# Patient Record
Sex: FEMALE | Race: WHITE
[De-identification: names, ages, dates, MRNs, and addresses within clinical notes are randomized per-mention and may not be internally consistent; named-entity substitution may affect disease eponyms.]

---

## 2019-04-02 ENCOUNTER — HOSPITAL ENCOUNTER (OUTPATIENT)
Dept: HOSPITAL 95 - LAB EV | Age: 69
Discharge: HOME | End: 2019-04-02
Attending: FAMILY MEDICINE
Payer: COMMERCIAL

## 2019-04-02 DIAGNOSIS — I47.1: Primary | ICD-10-CM

## 2019-04-02 DIAGNOSIS — R50.9: ICD-10-CM

## 2019-04-02 LAB
ALBUMIN SERPL BCP-MCNC: 3.9 G/DL (ref 3.4–5)
ALBUMIN/GLOB SERPL: 1 {RATIO} (ref 0.8–1.8)
ALT SERPL W P-5'-P-CCNC: 48 U/L (ref 12–78)
ANION GAP SERPL CALCULATED.4IONS-SCNC: 13 MMOL/L (ref 6–16)
AST SERPL W P-5'-P-CCNC: 31 U/L (ref 12–37)
BASOPHILS # BLD AUTO: 0.04 K/MM3 (ref 0–0.23)
BASOPHILS NFR BLD AUTO: 1 % (ref 0–2)
BILIRUB SERPL-MCNC: 0.7 MG/DL (ref 0.1–1)
BUN SERPL-MCNC: 15 MG/DL (ref 8–24)
CALCIUM SERPL-MCNC: 9.2 MG/DL (ref 8.5–10.1)
CHLORIDE SERPL-SCNC: 98 MMOL/L (ref 98–108)
CO2 SERPL-SCNC: 25 MMOL/L (ref 21–32)
CREAT SERPL-MCNC: 1.34 MG/DL (ref 0.4–1)
DEPRECATED RDW RBC AUTO: 45.1 FL (ref 35.1–46.3)
EOSINOPHIL # BLD AUTO: 0.11 K/MM3 (ref 0–0.68)
EOSINOPHIL NFR BLD AUTO: 1 % (ref 0–6)
ERYTHROCYTE [DISTWIDTH] IN BLOOD BY AUTOMATED COUNT: 13.2 % (ref 11.7–14.2)
GLOBULIN SER CALC-MCNC: 3.8 G/DL (ref 2.2–4)
GLUCOSE SERPL-MCNC: 112 MG/DL (ref 70–99)
HCT VFR BLD AUTO: 42.6 % (ref 33–51)
HGB BLD-MCNC: 14.1 G/DL (ref 11.5–16)
IMM GRANULOCYTES # BLD AUTO: 0.01 K/MM3 (ref 0–0.1)
IMM GRANULOCYTES NFR BLD AUTO: 0 % (ref 0–1)
LYMPHOCYTES # BLD AUTO: 1.62 K/MM3 (ref 0.84–5.2)
LYMPHOCYTES NFR BLD AUTO: 21 % (ref 21–46)
MCHC RBC AUTO-ENTMCNC: 33.1 G/DL (ref 31.5–36.5)
MCV RBC AUTO: 95 FL (ref 80–100)
MONOCYTES # BLD AUTO: 0.74 K/MM3 (ref 0.16–1.47)
MONOCYTES NFR BLD AUTO: 9 % (ref 4–13)
NEUTROPHILS # BLD AUTO: 5.39 K/MM3 (ref 1.96–9.15)
NEUTROPHILS NFR BLD AUTO: 68 % (ref 41–73)
NRBC # BLD AUTO: 0 K/MM3 (ref 0–0.02)
NRBC BLD AUTO-RTO: 0 /100 WBC (ref 0–0.2)
PLATELET # BLD AUTO: 290 K/MM3 (ref 150–400)
POTASSIUM SERPL-SCNC: 4.1 MMOL/L (ref 3.5–5.5)
PROT SERPL-MCNC: 7.7 G/DL (ref 6.4–8.2)
SODIUM SERPL-SCNC: 136 MMOL/L (ref 136–145)
TROPONIN I SERPL-MCNC: <0.017 NG/ML (ref 0–0.04)
TSH SERPL DL<=0.005 MIU/L-ACNC: 2.19 UIU/ML (ref 0.36–4.8)

## 2021-09-24 NOTE — NUR
ICU STATUS: PT BECOMING MORE AGGITATED AND PULLING OFF NASEL CANNULA AND MASK
0.25 MG OF ATIVAN GIVEN AND PT ABLE TO REST FOR ABOUT 20MIN BEFORE CONTINUING
TO PULL AT O2. SP02 RANGING 88-90%. DR. MORRELL NOTIFIED AT ABOUT 1435 AND
PRECEDEX STARTED AT 10 MG/KG/HR BY YANI, ICU RN. REPORT PASSED TO YANI ICU
RN AT ABOUT 1455.

## 2021-09-24 NOTE — NUR
PT IS AO X 3,PT HAVE AN EXCORIATED RASH THOUGHOUT HER GROIN AND SKIN FOLS
AREA.PT IS COVID POSITVE.PT SATS DROP TO LOW 80'S.CALLED RT
RT PUT HER ON AIRVO 45L FIOZ 90% PT WAS STILL SATTING IN THE 80'S.
PT GOT TRANSFER TO PCU FOR CLOSE MONITORING.

## 2021-09-24 NOTE — NUR
SHIFT SUMMARY
PT RESTING, IN NAD. NO ACUTE CONCERNS NOTED SINCE ARRIVAL TO MEDICAL FLOOR.
APPEARS INCREASINGLY ALERT. VSS. NO ACUTE NEEDS ASSESSED AT THIS TIME. CALL
LIGHT, POSSESSIONS IN REACH, BED IN LOW AND LOCKED POSITION WITH ALARMS ON.
IVF INFUSING AS ORDERED. WILL CONTINUE TO PROVIDE CARE AS NEEDED UNTIL REPORT
GIVEN TO ONCOMING RN.

## 2021-09-24 NOTE — NUR
SP02 86% ON 92% AIRVO, PT DOES NOT APPEAR ANXIOUS AT THIS TIME. RT CALLED. PT
SWITCHED TO MASK WITH AIRVO AS WELL AS HIGH FLOW NASEL CANNULA AT 10L, PT
CURRENTLY 91% SP02. WILL CTM.

## 2021-09-24 NOTE — NUR
RECEIVED REPORT FROM LATANYA LU RN. PT TRANSPORTED TO MEDICAL FLOOR VIA GURNEY,
TRANSFERRED TO BED. ORIENTED TO ROOM/UNIT, MADE COMFORTABLE. VS TAKEN, WNL; O2
SATS STABLE ON 4L/NC. PT DENIES SOB. NO OTHER ACUTE NEEDS ASSESSED AT THIS
TIME. CALL LIGHT, POSSESSIONS IN REACH, BED IN LOW AND LOCKED POSITION WITH
ALARMS ON.

## 2021-09-24 NOTE — NUR
SHIFT SUMMARY
  PATIENT REMAINED ON PRECEDEX TO HELP WITH ANXIETY AND AGITATION.  PATIENT ON
PRECEDEX AT 1.0 MCG/ KG/ HOUR.  BASLINE TREMORS HAVE NOT BEEN NOTED WHILE
PATIENT SLEPT SOUNDLY.  PATIENT SLEEPING AT THIS TIME.  PATIENT AFEBRILE.
PATIENT REMAINS ON AIRVO TO MASK AND HF NC AT SAME SETTINGS.  HR 60S TO LOW
100S.  SBP LOW 100S TO 180S.  PATIENT HAD BM TODAY.  ROLDAN PLACED THIS SHIFT;
DRAINING DARK YELLOW COLORED URINE.  CARLOS AREA CLEANSED THIS SHIFT AND PINK
CREAM APPLIED.  ATTENDS LEFT OFF TO ALLOW AREA TO DRY OUT.  CT PE STUDY
PERFORMED THIS SHIFT.  PATIENT APPEARS COMFORTABLE AT THIS TIME.  BED LOW,
CALL LIGHT IN REACH.  REPORT HAS BEEN GIVEN TO ASSUMING NIGHT SHIFT NURSE.

## 2021-09-24 NOTE — NUR
ADMIT: 9/23/21 DISCHARGE: DX: COVID, Resp. Failure CC: [facility]
RICHARD CALL:
RESIDENCE: home by herself
EMERGENCY CONTACT: Laura Capps, Friend, [phone]
DX: COVID-19, anxiety, MARTHA, see list
DME: CPAP, 4-wheeled walker, knee brace
CCM: Referral - 2017
HOME HEALTH: [facility] - 2018
SUMMARY: (Admit: 9/23/21)
9/24/21- per chart review with Dr. Lemus, pt was admitted to PCU due to air
demand increasing. -aba

## 2021-09-24 NOTE — NUR
ASSUMPTION OF CARE
  ASSUMED CARE OF PATIENT AROUND 1445.  PATIENT STARTED ON PRECEDEX AND PLACED
IN RESTRAINTS AS CONTINUING TO PULL O2 OFF AND DESATTING.  PRECEDEX CURRENTLY
AT 0.7 MCG/ KG/ HOUR.  PATIENT TOLD A NURSE ON MED FLOOR THAT SHE HAS BASELINE
TREMORS.  PATIENT ORIENTED TO SELF WHEN FIRST ASSUMED BUT IS SO ANXIOUS AND
SOB THAT SHE IS NOT ANSWERING QUESTIONS AT THIS TIME.  PATIENT APPEARS
CONFUSED TO NURSE.  PATIENT ON FLUSHED HF NC WITH AIRVO TO MASK OVER HF NC AS
PATIENT IS MOUTH BREATHER.  AIRVO AT 60 L AND 95% FIO2.  RT HAS BIPAP AROUND
CORNER IF PATIENT ENDS UP NEEDING IT.  PATIENT HR 90S TO LOW 100S.  SBP 140S
TO 180S.  ABD SOFT, NON-DISTENDED, WITH HYPOACTIVE BS NOTED.  ROLDAN DRAINING
DARK YELLOW COLORED URINE.  PATIENT VERY MOIST AND MACERATED IN CARLOS AREA
REGIONS.  FISSURE TO INTERGLUTEAL FOLD.  COCCYX REDDENED.  BED LOW, CALL LIGHT
IN REACH, BED ALARM ON.

## 2021-09-25 NOTE — NUR
pt laying in bed with eyes closed, does not respond to verbal, did moan with
touch, heart rate is touching the 30's, turned precedex down to 0.6, soft
wrist restraints in place, lungs are dim t/o, resp even and unlabored, no
cough noted, hrr, tele in place running sb per monitor see strip, no edema
noted, ppp+2, cap refill <3sec, vs stable, afebrile, iv site is clear and
patent, btx4, abd flat soft nontender, voids via vincent cath, attends in place,
skin has red excoriated becca area, wiggles feet when asked, moans when
touched. call light in reach.

## 2021-09-25 NOTE — NUR
ANANYA IS SLEEPING, HAS BEEN SLEEPING BY APPEARANCES FOR LAST HOUR OR SO. VITALS
ARE STABLE, PRECEDEX ADJUSTED BACK TO 0.5 FROM 0.6 MCG/KG/HR. CONTINUES ON
CPAP 90%, HAVE BEEN ABLE TO KEEP HER MONITOR ON, SWB ON. WILL CONTINUES TO
MONITOR.

## 2021-09-25 NOTE — NUR
pt heart rate regularly hits the 30's and had a pause, placed on stand by for
now. call light in reach.

## 2021-09-25 NOTE — NUR
ANANYA HAS DONE WELL T/O THE NIGHT. SHE CONTINUES ON PRECEDEX, IT HAS BEEN
TITRATED DOWN TO 0.7MCG/KG/HR, SHE IS TOLERATING THAT WELL. SHE CONTINUES TO
SLEEP WITH THE AIRVO NC/MASK @ 60L/70%. SHE HAS DROPPED ON OCC TO LOW 40'S FOR
HER HR, OCC DESAT TO MID 80'S. ESPECIALLY AFTER MASK REMOVED FOR ORAL CARE AND
AFTER TURNS WITH HER BREATH HOLDING. SHE CONTINUES TO ONLY MOAN ON OCC,ANSWERS
BRIEFLY WITH DIFFICULT TO UNDERSTAND WORDS. SHE IS REASSURED AND REORIENTED
OFTEN. SALINE LOCK IN LEFT A/C FLUSHES WELL, SL IN LEFT WRIST WITH PRECEDEX
INFUSING. WILL CONTINUE TO MONITOR.

## 2021-09-25 NOTE — NUR
RECEIVED REPORT FROM JUDITH CORONEL PULLING ON HER WIRES FOR THE CARDIAC
MONITORING, ENTERED ROOM, INTRODUCED SELF, ASKED IF SHE KNEW WHERE SHE WAS, PT
SQUEAKED AT STAFF, NO WORDS. TOLD HER WE WERE CHECKING HER MONITORING
EQUIPMENT AND THAT WE WERE ADDING STICKERS, SHE SCREAMS WITH ANY TOUCH. BEGINS
TO PULL ON HER CATHETER, TOLD THAT WOULD BE VERY PAINFUL, SHE IS SCREAMING AT
STAFF, GOT HER TO LET GO OF HER CATHETER, TRIED TO CLEAN HER CARLOS/INNER THIGHS
TO HER SCREAMS AND TIGHTENING HER LEGS TOGETHER. BOTH RN AND PCT ARE FEMALE,
TRYING TO REASSURE HER WE ARE CLEANING HER UP AND ADDING LOTION TO HER
EXCORIATED THIGHS, REPLACED STAT LOCK THAT SHE HAD BROKEN. PT CONTINUES TO
SCREAM AT US. PT REPOSITIONED, ENCOURAGED. REMINDED HER SHE IS IN THE
HOSPITAL, THAT SHE HAS COVID AND THAT WE ARE CARING FOR HER. HER CPAP IS ON AT
90%, ATTEMPTS TO READJUST WITH MUCH RESISTENCE FROM HER. PT CONTINUES TO SAY
"I CAN'T SLEEP" "I CAN'T SLEEP". PT TOLD SHE DOES NOT NEED TO SLEEP, JUST TO
CALM DOWN, WE ARE TRYING TO HELP HER. RT RON IN TO CHECK ON HER, SHE DOESN'T
MAKE MUCH RESPONSE TO HIM AT ALL. PULLING AT HER COVERS, ASKED IF SHE IS WARM,
STATES YES, BLANKETS REMOVED. ENCOURAGED TO KEEP HER SHEET ON. KEEPS REPEATING
THAT SHE "CAN'T SLEEP". SOFT MUSIC CHANNEL PUT ON TV TO ASSIST WITH CALMING.

## 2021-09-25 NOTE — NUR
TRIED TO GIVE ANANYA HER EVENING SEROQUEL AND SHE CONTINUED TO SPIT IT OUT AT ME
AND SCREAM. TOOK IN SIPS OF WATER ONLY.

## 2021-09-25 NOTE — NUR
pt settled down, has been turned and attempted to swab mouth using airvo but
this just agitated her more. got her positioned on her side, no further
changes this shift. call light in reach.

## 2021-09-25 NOTE — NUR
pt became agitated and started pulling on things and shaking, she was
repositioned, really fighting everything and got her back on 0.2mg/hr of
precedex. seems to be calming. call light in reach.

## 2021-09-26 NOTE — NUR
Comfort care visit to pt and sister, Jian, at bedside -
Earlier this am called to assist with transition to comfort care. I case
conferenced with bedside RN and Dr in am.  visiting when I came by to
see pt/sister late am and I returned this afternoon. Jian tearful and
appropriately grieving. Her questions were answered and support given. Large
water brought to her as she had been at bedside for hours. Pt was breathing
without distress and did not exhibit any nonverbal indicators of pain, anxiety
or distress while I was in the room. No coughing noted at that time. Sister
had removed rings on pt's right hand because she felt they were too tight. I
asked that she take them home if they were to remain off. Jian concerned
about turquoise ring on left hand being too tight also. I checked and could
easily move the ring around on finger. It would not be easy to slide over
enlarged knuckle though and pt pulled her hand away when I was assessing.
Sister comfortable with leaving the ring on her left hand in place.  Pt did
not wake or respond to verbal/tactile stimuli otherwise while I was in the
room for approx 15-20 minutes. Update given to pt's RN on my visit and we
further discussed comfort care medications per eMAR. Pt started coughing once
I was outside of the room and RN going in to assess and medicate prn.
Discussed plan of care for next 24 hours with sister, Jian. She states their
brother may come on Tuesday if pt is still with us.

## 2021-09-26 NOTE — NUR
THE PATIENT IS RESTING COMFORTABLY IN BED. MORPHINE AND ATIVAN ARE GIVEN TO
HELP MAKE THE PAIENT MORE COMFORTABLE. PATIENT WAS REPOSITIONED ONCE TO MAKE
HER MORE COMFORTABLE.

## 2021-09-26 NOTE — NUR
THE PATIENT IS RESTING COMFORTABLY IN BED. SHE IS CURRENTLY COMFORT CARE.
MORPHINE AND ATIVAN HAVE BEEN GIVEN FOR COMFORT. SHE TENDS TO GET RESTLESS AND
HAS TREMORS AT BASELINE. SHE HAS BEEN SATURATING IN THE 70s AND HR HAS BEEN
BOUNCING AROUND. WILL CONTINUE TO PROVIDE COMFORT FOR THE PATIENT.

## 2021-09-26 NOTE — NUR
ANANYA HAS BEEN ABLE TO REST SINCE GIVEN THE DOSE OF ATIVAN, SHE HAS NOT BEEN
PULLING OFF HER LEADS, SHE DOES REMAIN WITH SWR, SHE HAS BEEN TURNED Q2, ORAL
CARE COMPLETED WITH GREEN SWABS AND WATER. TOLERATED WELL. SHE DID TOLERATE
THE BIPAP, CONTINUES ON PRECEDEX, BACK DOWN TO 0.4MCG/KG/HR, HER 3RD LITER OF
FLUIDS INFUSING AT 150ML/HR. SHE DID NOT TAKE HER ORAL MEDICATION PER EARLIER
NOTES. HER HEART RATE REMAINS 40-60'S AND BP STABLE. LUNGS CLEAR BUT
DIMINISHED WITH OCC DRY COUGH. WILL CONTINUE TO MONITOR AND REPORT TO NEXT
SHIFT WHEN ABLE.

## 2021-09-26 NOTE — NUR
PT NO LONGER PULLING AT LINES, NO SIGNS OF ACUTE DISTRESS AT THIS TIME.
PRECEDEX DECREASED TO 0.4 MCG/KG/HR.

## 2021-09-26 NOTE — NUR
PT BECAME ANXIOUS, SQUIRMING IN BED AND MOANING. PT STARTED COUGHING,
INCREASED WORK OF BREATHING NOTED. REPOSITIONED FOR COMFORT AND BOOSTED IN
BED. GIVEN ATIVAN AND MORPHINE PER EMAR. PRECEDEX GTT DECREASED TO 0.2
MCG/KG/HR. PT'S SISTER KETAN AT BEDSIDE, QUESTIONS ANSWERED TO SATISFACTION
AND REASSURANCE PROVIDED.

## 2021-09-26 NOTE — NUR
NO ACUTE EVENTS THIS SHIFT. PT'S BIPAP REMOVED WHEN SISTER KETAN ARRIVED PER
FAMILY REQUEST. PT APPEARED MUCH MORE COMFORTABLE WITH MASK OFF, NO LONGER
PULLING AT MASK. PT REPOSITIONED T/O SHIFT. ORAL CARE DONE AND SOME HYDRATION
OFFERED VIA MOUTH SWABS, PT UNABLE TO TOLERATE DRINKING FROM A CUP WITHOUT
COUGHING. MEDICATED FOR PAIN/DYSPNEA PER EMAR. PT'S SISTER KETAN AT BEDSIDE
FOR MOST OF SHIFT, QUESTIONS ANSWERED TO SATISFACTION. PT'S BROTHER GUNNAR CALLED
AND CALL WAS TRANSFERED INTO ROOM, THIS RN HELD PHONE SO THAT GUNNAR COULD TALK
TO THE PT ON THE PHONE. PT UNABLE TO FOLLOW COMMANDS THIS SHIFT, WOULD
SOMETIMES GRAB STAFF'S HANDS AND SQUEEZE WHEN UNCOMFORTABLE, BUT UNABLE TO
SQUEEZE ON COMMAND. BED BATH COMPLETED THIS MORNING. PT ON ROOM AIR AT THIS
TIME.
TIME.

## 2021-09-26 NOTE — NUR
Introduction:
PT refered by nursing staff, after comfort measures protocol.
Assessment:
PT presented lying in bed, peaceful with one family member present.(Sister
Jian) PT was breathing normal and did not appear to be in pain or distress.
Intervention:
PT and family offered scripture reading. Family offered reassurance of God's
presence with them during this end of life journey. PT and family offered
prayer.
Outcome:
PT and family received prayer and scripture reading.
Follow-Up:
As needed or requested.

## 2021-09-27 NOTE — NUR
PT BOOSTED UP IN BED AND REPOSITIONED ONTO HER LEFT SIDE.  BLE ELEVATED ON
PILLOWS. L ARM ELEVATED ON A PILLOW.  PT APPEARS PAINFUL WITH REPOSITIONING
AND WAS PROVIDED WITH MORPHINE.  RESPIRATIONS APPEAR LABORED, MORPHINE GIVEN
FOR AIR HUNGER.

## 2021-09-27 NOTE — NUR
SHIFT SUMMARY
 
THE PATIENT LOOKS COMFORTABLE. HER BREATHING HAS SLOWED DOWN A BIT AND SHE HAS
SOME PERIODS OF APNEA FROM OBSERVATION. SHE WAS MEDICATED THROUGHOUT THE NIGHT
AND TURNED AS NEED AND ORAL CARE WAS DONE AS NEEDED. SHE HAD URINE OUTPUT OF
575 FROM HER ROLDAN CATHETER. WILL CONTINUE TO MONITOR AND MAKE HER
COMFORTABLE.

## 2021-09-27 NOTE — NUR
THE PATIENT IS RESTING COMFORTABLY IN BED SHE HAS SOME TREMORS WHICH IS HER
BASELINE BUT I MEDICATED HER TO MAKE HER FEEL COMFORTABLE. VITAL ARE STILL
HOLING STEADY WITH HER OXYGEN SATURATION IN THE 70S AND HR REMAINS BETWEEN
. WILL CONTINUE TO MONITOR.

## 2021-09-27 NOTE — NUR
PATIENT WAS MEDICATED WITH MORPHINE AND ORAL CARE WAS DONE. SHE LOOKS
COMFORTABLE IN BED AND RESTING. WILL CONTINUE TO MONITOR.

## 2021-09-27 NOTE — NUR
PT IS RESTING COMFORTABLY WHEN NO DISTURBED.  FAMILY AT THE BEDSIDE HOLDING
PT'S HAND AND TALKING TO HER.  PT OPENS EYES IN RESPONSE TO PAIN/DISCOMFORT
AND MOANS WHEN AWAKE.  PT'S SKIN IS WARM TO THE TOUCH AND ADDITIONAL BLANKET
PULLED BACK TO HELP REGULATE TEMPURATURE.  PT WAS REPOSITIONED TO HER RIGHT
SIDE AND HOB ELEVATED.  PT HAS MOIST COUGH.  PROVIDED THERAPEUTIC LISTENING
FOT PT'S SISTER.  WILL CONTINUE TO MONITOR.

## 2021-09-27 NOTE — NUR
PT RESTING QUIETLY UPON ENTERING THE ROOM.  MORPHINE PROVIDED FOR DYSPNEA AND
PRE MEDICATION FOR MOVEMENT.  PT'S RESPIRATORY RATE IS SLOWING AND APPEARS
MORE LABORED.  PT REPOSITIONED TO SUPINE POSITION WITH ARMS AND LEGS ELEVATED
ON PILLOWS.  PT MOANS AND BECOMES MORE TREMULOUS WHEN REPOSITIONED.  SAT WITH
PT AND HELD HER HAND, APPEARED TO RELAX WITH STAFF PRESENT AT BEDSIDE.

## 2021-09-27 NOTE — NUR
SHIFT SUMMARY
PT HAS BEEN QUIETLY RESTING IN BED T/O THE SHIFT.  PAIN AND DYSPNEA MANAGED
WITH MORPHINE.  FAMILY SAT AT THE BEDSIDE THIS MORNING.  PT'S RESP RATE HAS
DECREASED T/O THE DAY.  PT OPENS HER EYES AND MOANS WHEN REPOSITIONED
OTHERWISE UNRESPONSIVE.  PT HAS BEEN REPOSITIONED APPROXIMATELY EVERY 3 HOURS
FOR COMFORT, PT DOES APPEAR TO BE PAINFUL WHEN REPOSITIONING.  WILL CONTINUE
TO MONITOR UNTIL REPORT TO NOC RN.

## 2021-09-27 NOTE — NUR
SPOKE WITH PT'S SISTER KETAN TO PROVIDE UPDATE ON HER CONDITION.  FAMILY PLANS
TO COME AND VISIT TODAY.

## 2021-09-27 NOTE — NUR
PT RESTING QUIETLY IN BED.  SHE WAS REPOSITIONED ON TO HER LEFT SIDE AND HEALS
FLOATED ON PILLOWS.  PT BECAME ANXIOUS AND MOANED WITH REPOSITIONING.
MORPHINE PROVIDED FOR COMFORT.  HANDS AND FACE CLEANED.  CARLOS CARE PROVIDED.
ATTEMPTED TO MOISTEN MOUTH BUT PT DID NOT OPEN HER MOUTH FOR ORAL CARE.  PT IS
NON VERBAL WITH NO MEANINGFUL MOVEMENTS AT THIS TIME.  TREMOR INCREASES WITH
REPOSITIONING.  STAFF TALKED TO AND EXPLAINED CARE AS IT WAS PERFORMED.  PT'S
SKIN WAS COOL TO THE TOUCH AND AN ADDITIONAL BLANKET WAS PROVIDED.  PT
APPEARED TO RELAX AFTER CARE WAS COMPLETE AND TREMOR REDUCED.  AT THIS TIME PT
IS RESTING QUIETLY.  WILL CONTINUE TO MONITOR.

## 2021-09-27 NOTE — NUR
Comfort care visit this am and case conference with RN this afternoon. Pt
appears very calm and comfortable both visits. Sister at bedside and
verbalized doing better than yesterday in regard to feeling helpless and sad.
Brother and mom have decided not to come due to their own medical frailty and
risk factors for exposure to COVID. Pal [facility] to cont to follow for support.

## 2021-09-28 NOTE — NUR
FACILITY CHOICE
PER SISTER WHO WAS HERE TO VISIT: [facility] IN [facility]

## 2021-09-28 NOTE — NUR
PT LAYING IN BED. RESP UNEVEN & IRREGULAR. MEDICATED, REPOSITIONED, & ORAL
CARE DONE. DOES NOT SHOW S/S OF PAIN W/ REPOSITIONING.

## 2021-09-28 NOTE — NUR
SHIFT SUMMARY
PATIENT MEDICATED THROUGH OUT SHIFT FOR COMFORT. SEE EMAR. PATIENT WAS TURNED
ONCE DURING SHIFT FOR HER COMFORT AS SHE DID NOT TOLERATE ANY SORT OF TURNING.
PATIENT ONLY OPENS EYES WITH PAIN BUT OTHERWISE DOES NOT RESPOND.
SPO2 SPOT CHECKS PERFORMED THROUGHOUT SHIFT. NO OTHER SIGNIFICANT CHANGES TO
PATIENT THIS SHIFT.

## 2021-09-28 NOTE — NUR
SHIFT SUMMARY
PT'S RESPIRATIONS HAVE BECOME MORE APNEIC IN THE LAST 2-3 HOURS. COUGH
CONTINUES TO BE WET/NON PRODUCTIVE. GENTLE SUCTION ATTEMPTED TO SIDE OF
CHEEKS BUT NOTHING TO SUCTION. MEDICATED T/O SHIFT DEPENDING ON S/S OF PAIN VS
ANXIETY. PT NON RESPONSIVE T/O SHIFT BUT DOES FURROW BROWS OR MAKE SOME NOISES
WHEN UNCOMFORTABLE.

## 2021-09-28 NOTE — NUR
9/27/21- pt is slowly deteriorating. Pt is expected to pass here in the
hospital. Family has been in to visit and see the pt. and be updated on her
prognosis.  -moris

## 2021-09-29 NOTE — NUR
SHIFT SUMMARY
NO SIGNIFICANT CHANGES TO PATIENT'S STATUS THIS SHIFT. PATIENT MEDICATED FOR
COMFORT THROUGH OUT NIGHT. SEE EMAR. DECREASED RESPIRATIONS AND APENIC
PERIODS. PATIENT REMAINS UNREPONSIVE BUT WILL GROAN IF UNCOMFORTBLE. WET,
NONPRODUCTIVE COUGH PRESENT. WILL CONTINUE TO MONITOR AND REPORT TO DAY SHIFT
RN.

## 2021-09-29 NOTE — NUR
PT RESPIRATIONS SLOWING AND SATS DROPPING. RESPIRATIONS STOPPED AND 2ND RN IN
TO ASSESS HR. NO HEART BEAT HEARD FOR 2 MIN. TIME OF DEATH 0940. CALL PLACED
TO FAMILY. PATIENT  HOME [place] IN [facility]. POST MORTEM CARE
COMPLETED.

## 2021-09-29 NOTE — NUR
PT OBTUNDED AND NOT ABLE TO RESPOND VERBALLY. NOT OPENING EYES. COMFORT CARE
ASSESMENT DONE. PATIENT SEEMS COMFORTABLE IN BED. AUDIBLE RATTLE, INTERMITTENT
BREATHING. MEDICATED FOR SECREATIONS. REPOSITIONING AS NEEDED. WILL CONTINUE
TO MONITOR.

## 2021-09-29 NOTE — NUR
[facility]  HOME FROM [facility] IN TO  PATIENT AT THIS TIME.
PATIENT BELONGINGS OF NICOLLEMAS SENT WITH PATIENT.

## 2021-09-29 NOTE — NUR
Pt is being medicated for comfort, having decrease in respirations at times
apenic and unresponsive. -kjb ([id number], 9:45 AM)
